# Patient Record
Sex: FEMALE
[De-identification: names, ages, dates, MRNs, and addresses within clinical notes are randomized per-mention and may not be internally consistent; named-entity substitution may affect disease eponyms.]

---

## 2019-01-01 ENCOUNTER — HOSPITAL ENCOUNTER (INPATIENT)
Dept: HOSPITAL 90 - EDH | Age: 69
LOS: 4 days | Discharge: HOME | End: 2019-01-05
Payer: MEDICARE

## 2019-01-01 DIAGNOSIS — E86.9: ICD-10-CM

## 2019-01-01 DIAGNOSIS — J18.9: ICD-10-CM

## 2019-01-01 DIAGNOSIS — R11.2: ICD-10-CM

## 2019-01-01 DIAGNOSIS — J45.909: ICD-10-CM

## 2019-01-01 DIAGNOSIS — K57.92: Primary | ICD-10-CM

## 2019-01-01 DIAGNOSIS — I10: ICD-10-CM

## 2019-01-01 DIAGNOSIS — I73.9: ICD-10-CM

## 2020-11-28 ENCOUNTER — HOSPITAL ENCOUNTER (INPATIENT)
Dept: HOSPITAL 90 - EDH | Age: 70
LOS: 3 days | Discharge: HOME HEALTH | DRG: 623 | End: 2020-12-01
Attending: INTERNAL MEDICINE | Admitting: INTERNAL MEDICINE
Payer: MEDICARE

## 2020-11-28 VITALS — BODY MASS INDEX: 43.24 KG/M2 | WEIGHT: 235 LBS | HEIGHT: 62 IN

## 2020-11-28 DIAGNOSIS — Z83.3: ICD-10-CM

## 2020-11-28 DIAGNOSIS — N18.9: ICD-10-CM

## 2020-11-28 DIAGNOSIS — R32: ICD-10-CM

## 2020-11-28 DIAGNOSIS — L02.611: ICD-10-CM

## 2020-11-28 DIAGNOSIS — E66.01: ICD-10-CM

## 2020-11-28 DIAGNOSIS — E78.5: ICD-10-CM

## 2020-11-28 DIAGNOSIS — E11.42: ICD-10-CM

## 2020-11-28 DIAGNOSIS — M54.9: ICD-10-CM

## 2020-11-28 DIAGNOSIS — D64.9: ICD-10-CM

## 2020-11-28 DIAGNOSIS — E11.51: ICD-10-CM

## 2020-11-28 DIAGNOSIS — Z20.828: ICD-10-CM

## 2020-11-28 DIAGNOSIS — L03.031: ICD-10-CM

## 2020-11-28 DIAGNOSIS — I12.9: ICD-10-CM

## 2020-11-28 DIAGNOSIS — E11.621: Primary | ICD-10-CM

## 2020-11-28 DIAGNOSIS — L97.519: ICD-10-CM

## 2020-11-28 DIAGNOSIS — N17.9: ICD-10-CM

## 2020-11-28 DIAGNOSIS — Z98.1: ICD-10-CM

## 2020-11-28 DIAGNOSIS — Z96.652: ICD-10-CM

## 2020-11-28 DIAGNOSIS — Z98.84: ICD-10-CM

## 2020-11-28 DIAGNOSIS — G89.29: ICD-10-CM

## 2020-11-28 DIAGNOSIS — Z90.710: ICD-10-CM

## 2020-11-28 LAB
ALBUMIN SERPL-MCNC: 2.8 G/DL (ref 3.5–5)
ALT SERPL-CCNC: 44 U/L (ref 12–78)
APTT PPP: 30.8 SEC (ref 26.3–35.5)
AST SERPL-CCNC: 56 U/L (ref 10–37)
BASOPHILS NFR BLD AUTO: 0.4 % (ref 0–5)
BILIRUB SERPL-MCNC: 0.5 MG/DL (ref 0.2–1)
BUN SERPL-MCNC: 24 MG/DL (ref 7–18)
CHLORIDE SERPL-SCNC: 101 MMOL/L (ref 101–111)
CO2 SERPL-SCNC: 25 MMOL/L (ref 21–32)
CREAT SERPL-MCNC: 2.9 MG/DL (ref 0.5–1.5)
EOSINOPHIL NFR BLD AUTO: 1.6 % (ref 0–8)
ERYTHROCYTE [DISTWIDTH] IN BLOOD BY AUTOMATED COUNT: 14.7 % (ref 11–15.5)
GFR SERPL CREATININE-BSD FRML MDRD: 17 ML/MIN (ref 60–?)
GLUCOSE SERPL-MCNC: 100 MG/DL (ref 70–105)
HCT VFR BLD AUTO: 30.2 % (ref 36–48)
INR PPP: 0.95 (ref 0.85–1.15)
LYMPHOCYTES NFR SPEC AUTO: 12.3 % (ref 21–51)
MCH RBC QN AUTO: 27.9 PG (ref 27–33)
MCHC RBC AUTO-ENTMCNC: 31.5 G/DL (ref 32–36)
MCV RBC AUTO: 88.6 FL (ref 79–99)
MONOCYTES NFR BLD AUTO: 10.5 % (ref 3–13)
NEUTROPHILS NFR BLD AUTO: 74.2 % (ref 40–77)
NRBC BLD MANUAL-RTO: 0 % (ref 0–0.19)
PLATELET # BLD AUTO: 254 K/UL (ref 130–400)
POTASSIUM SERPL-SCNC: 3.7 MMOL/L (ref 3.5–5.1)
PROT SERPL-MCNC: 7 G/DL (ref 6–8.3)
PROTHROMBIN TIME: 10.3 SEC (ref 9.6–11.6)
RBC # BLD AUTO: 3.41 MIL/UL (ref 4–5.5)
SODIUM SERPL-SCNC: 137 MMOL/L (ref 136–145)
WBC # BLD AUTO: 13.7 K/UL (ref 4.8–10.8)

## 2020-11-28 PROCEDURE — 87076 CULTURE ANAEROBE IDENT EACH: CPT

## 2020-11-28 PROCEDURE — 83550 IRON BINDING TEST: CPT

## 2020-11-28 PROCEDURE — 73630 X-RAY EXAM OF FOOT: CPT

## 2020-11-28 PROCEDURE — 87426 SARSCOV CORONAVIRUS AG IA: CPT

## 2020-11-28 PROCEDURE — 85610 PROTHROMBIN TIME: CPT

## 2020-11-28 PROCEDURE — 87070 CULTURE OTHR SPECIMN AEROBIC: CPT

## 2020-11-28 PROCEDURE — 82948 REAGENT STRIP/BLOOD GLUCOSE: CPT

## 2020-11-28 PROCEDURE — 73718 MRI LOWER EXTREMITY W/O DYE: CPT

## 2020-11-28 PROCEDURE — 93005 ELECTROCARDIOGRAM TRACING: CPT

## 2020-11-28 PROCEDURE — 84100 ASSAY OF PHOSPHORUS: CPT

## 2020-11-28 PROCEDURE — 84550 ASSAY OF BLOOD/URIC ACID: CPT

## 2020-11-28 PROCEDURE — 83036 HEMOGLOBIN GLYCOSYLATED A1C: CPT

## 2020-11-28 PROCEDURE — 84484 ASSAY OF TROPONIN QUANT: CPT

## 2020-11-28 PROCEDURE — 82728 ASSAY OF FERRITIN: CPT

## 2020-11-28 PROCEDURE — 85027 COMPLETE CBC AUTOMATED: CPT

## 2020-11-28 PROCEDURE — 83540 ASSAY OF IRON: CPT

## 2020-11-28 PROCEDURE — 85730 THROMBOPLASTIN TIME PARTIAL: CPT

## 2020-11-28 PROCEDURE — 83735 ASSAY OF MAGNESIUM: CPT

## 2020-11-28 PROCEDURE — 36415 COLL VENOUS BLD VENIPUNCTURE: CPT

## 2020-11-28 PROCEDURE — 80048 BASIC METABOLIC PNL TOTAL CA: CPT

## 2020-11-28 PROCEDURE — 80053 COMPREHEN METABOLIC PANEL: CPT

## 2020-11-28 PROCEDURE — 87040 BLOOD CULTURE FOR BACTERIA: CPT

## 2020-11-28 PROCEDURE — 93925 LOWER EXTREMITY STUDY: CPT

## 2020-11-28 PROCEDURE — 85025 COMPLETE CBC W/AUTO DIFF WBC: CPT

## 2020-11-28 PROCEDURE — 71045 X-RAY EXAM CHEST 1 VIEW: CPT

## 2020-11-28 RX ADMIN — SODIUM CHLORIDE SCH UNIT: 9 INJECTION, SOLUTION INTRAVENOUS at 21:00

## 2020-11-29 VITALS — SYSTOLIC BLOOD PRESSURE: 114 MMHG | DIASTOLIC BLOOD PRESSURE: 46 MMHG

## 2020-11-29 VITALS — SYSTOLIC BLOOD PRESSURE: 114 MMHG | DIASTOLIC BLOOD PRESSURE: 58 MMHG

## 2020-11-29 VITALS — SYSTOLIC BLOOD PRESSURE: 97 MMHG | DIASTOLIC BLOOD PRESSURE: 54 MMHG

## 2020-11-29 VITALS — SYSTOLIC BLOOD PRESSURE: 121 MMHG | DIASTOLIC BLOOD PRESSURE: 55 MMHG

## 2020-11-29 VITALS — SYSTOLIC BLOOD PRESSURE: 98 MMHG | DIASTOLIC BLOOD PRESSURE: 48 MMHG

## 2020-11-29 LAB
ALBUMIN SERPL-MCNC: 2.3 G/DL (ref 3.5–5)
ALT SERPL-CCNC: 39 U/L (ref 12–78)
AST SERPL-CCNC: 54 U/L (ref 10–37)
BILIRUB SERPL-MCNC: 0.4 MG/DL (ref 0.2–1)
BUN SERPL-MCNC: 28 MG/DL (ref 7–18)
CHLORIDE SERPL-SCNC: 103 MMOL/L (ref 101–111)
CO2 SERPL-SCNC: 24 MMOL/L (ref 21–32)
CREAT SERPL-MCNC: 2.7 MG/DL (ref 0.5–1.5)
ERYTHROCYTE [DISTWIDTH] IN BLOOD BY AUTOMATED COUNT: 14.8 % (ref 11–15.5)
GFR SERPL CREATININE-BSD FRML MDRD: 18 ML/MIN (ref 60–?)
GLUCOSE SERPL-MCNC: 91 MG/DL (ref 70–105)
HCT VFR BLD AUTO: 28.6 % (ref 36–48)
MAGNESIUM SERPL-MCNC: 1.8 MG/DL (ref 1.8–2.4)
MCH RBC QN AUTO: 27.9 PG (ref 27–33)
MCHC RBC AUTO-ENTMCNC: 31.1 G/DL (ref 32–36)
MCV RBC AUTO: 89.7 FL (ref 79–99)
NRBC BLD MANUAL-RTO: 0 % (ref 0–0.19)
PHOSPHATE SERPL-MCNC: 5.4 MG/DL (ref 2.5–4.9)
PLATELET # BLD AUTO: 231 K/UL (ref 130–400)
POTASSIUM SERPL-SCNC: 3.7 MMOL/L (ref 3.5–5.1)
PROT SERPL-MCNC: 6.3 G/DL (ref 6–8.3)
RBC # BLD AUTO: 3.19 MIL/UL (ref 4–5.5)
SODIUM SERPL-SCNC: 138 MMOL/L (ref 136–145)
URATE SERPL-MCNC: 7.5 MG/DL (ref 2.6–7.2)
WBC # BLD AUTO: 9.9 K/UL (ref 4.8–10.8)

## 2020-11-29 PROCEDURE — 0JBQ0ZZ EXCISION OF RIGHT FOOT SUBCUTANEOUS TISSUE AND FASCIA, OPEN APPROACH: ICD-10-PCS | Performed by: PODIATRIST

## 2020-11-29 RX ADMIN — PIPERACILLIN SODIUM AND TAZOBACTAM SODIUM SCH MLS/HR: .375; 3 INJECTION, POWDER, LYOPHILIZED, FOR SOLUTION INTRAVENOUS at 21:37

## 2020-11-29 RX ADMIN — PANTOPRAZOLE SODIUM SCH MG: 40 TABLET, DELAYED RELEASE ORAL at 08:08

## 2020-11-29 RX ADMIN — ASCORBIC ACID, FOLIC ACID, NIACIN, THIAMINE, RIBOFLAVIN, PYRIDOXINE, CYANOCOBALAMIN, PANTOTHENIC ACID, BIOTIN SCH CAP: 100; 150; 6; 1; 20; 5; 10; 1.7; 1.5 CAPSULE, LIQUID FILLED ORAL at 08:10

## 2020-11-29 RX ADMIN — CLINDAMYCIN PHOSPHATE SCH MLS/HR: 6 INJECTION, SOLUTION INTRAVENOUS at 08:14

## 2020-11-29 RX ADMIN — CLINDAMYCIN PHOSPHATE SCH MLS/HR: 6 INJECTION, SOLUTION INTRAVENOUS at 21:44

## 2020-11-29 RX ADMIN — HYDROMORPHONE HYDROCHLORIDE PRN MG: 1 INJECTION, SOLUTION INTRAMUSCULAR; INTRAVENOUS; SUBCUTANEOUS at 22:44

## 2020-11-29 RX ADMIN — SODIUM CHLORIDE SCH UNIT: 9 INJECTION, SOLUTION INTRAVENOUS at 16:30

## 2020-11-29 RX ADMIN — SODIUM CHLORIDE SCH UNIT: 9 INJECTION, SOLUTION INTRAVENOUS at 21:00

## 2020-11-29 RX ADMIN — CLINDAMYCIN PHOSPHATE SCH MLS/HR: 6 INJECTION, SOLUTION INTRAVENOUS at 15:31

## 2020-11-29 RX ADMIN — PIPERACILLIN SODIUM AND TAZOBACTAM SODIUM SCH MLS/HR: .375; 3 INJECTION, POWDER, LYOPHILIZED, FOR SOLUTION INTRAVENOUS at 08:10

## 2020-11-29 RX ADMIN — SODIUM CHLORIDE SCH UNIT: 9 INJECTION, SOLUTION INTRAVENOUS at 11:30

## 2020-11-29 RX ADMIN — ACETAMINOPHEN PRN MG: 325 TABLET, FILM COATED ORAL at 08:09

## 2020-11-29 RX ADMIN — SODIUM CHLORIDE SCH UNIT: 9 INJECTION, SOLUTION INTRAVENOUS at 07:30

## 2020-11-29 RX ADMIN — HYDROMORPHONE HYDROCHLORIDE PRN MG: 1 INJECTION, SOLUTION INTRAMUSCULAR; INTRAVENOUS; SUBCUTANEOUS at 16:44

## 2020-11-29 NOTE — NUR
cm note

met with patient and states resides at home with spouse, uses cane for ambulation, does own 
adls. no home services. pt drives. dc plan is back home at time of dc. 

-------------------------------------------------------------------------------

Addendum: 11/29/20 at 1725 by JOSÉ LUIS RUTHERFORD CM

-------------------------------------------------------------------------------

Amended: Links added.

## 2020-11-29 NOTE — NUR
ADMISSION NOTE:



Admitted to floor via stretcher from ER. Fully awake and responsive. Denies pain or any 
discomfort. VS checked and recorded. Physical assessment done. ( See CPOE flow chart for 
full assessment). Has IV site to RAC #20g, SL - patent and intact. Photo of the wound to 
right foot taken and attached to chart. To consult Dr. Marshall in AM per ER staff report. 
Home meds listed. Oriented to room and used of call light. Policies and procedures 
explained. Agreed and verbalized understanding. Plan of care initiated. No apparent distress 
noted. Needs attended and cared for.

## 2020-11-30 VITALS — SYSTOLIC BLOOD PRESSURE: 123 MMHG | DIASTOLIC BLOOD PRESSURE: 63 MMHG

## 2020-11-30 VITALS — DIASTOLIC BLOOD PRESSURE: 77 MMHG | SYSTOLIC BLOOD PRESSURE: 137 MMHG

## 2020-11-30 VITALS — SYSTOLIC BLOOD PRESSURE: 151 MMHG | DIASTOLIC BLOOD PRESSURE: 79 MMHG

## 2020-11-30 VITALS — SYSTOLIC BLOOD PRESSURE: 136 MMHG | DIASTOLIC BLOOD PRESSURE: 53 MMHG

## 2020-11-30 VITALS — SYSTOLIC BLOOD PRESSURE: 143 MMHG | DIASTOLIC BLOOD PRESSURE: 50 MMHG

## 2020-11-30 VITALS — DIASTOLIC BLOOD PRESSURE: 76 MMHG | SYSTOLIC BLOOD PRESSURE: 159 MMHG

## 2020-11-30 LAB
BASOPHILS NFR BLD AUTO: 0.8 % (ref 0–5)
BUN SERPL-MCNC: 22 MG/DL (ref 7–18)
CHLORIDE SERPL-SCNC: 107 MMOL/L (ref 101–111)
CO2 SERPL-SCNC: 26 MMOL/L (ref 21–32)
CREAT SERPL-MCNC: 1.4 MG/DL (ref 0.5–1.5)
EOSINOPHIL NFR BLD AUTO: 4.9 % (ref 0–8)
ERYTHROCYTE [DISTWIDTH] IN BLOOD BY AUTOMATED COUNT: 14.7 % (ref 11–15.5)
FERRITIN SERPL-MCNC: 183 NG/ML (ref 15–150)
GFR SERPL CREATININE-BSD FRML MDRD: 39 ML/MIN (ref 60–?)
GLUCOSE SERPL-MCNC: 106 MG/DL (ref 70–105)
HBA1C MFR BLD: 6.7 % (ref 4–6)
HCT VFR BLD AUTO: 29.4 % (ref 36–48)
IRON SATN MFR SERPL: 11.9 % (ref 22–44)
IRON SERPL-MCNC: 20 MCG/DL (ref 50–170)
LYMPHOCYTES NFR SPEC AUTO: 19.6 % (ref 21–51)
MCH RBC QN AUTO: 28.2 PG (ref 27–33)
MCHC RBC AUTO-ENTMCNC: 32.7 G/DL (ref 32–36)
MCV RBC AUTO: 86.2 FL (ref 79–99)
MONOCYTES NFR BLD AUTO: 9.8 % (ref 3–13)
NEUTROPHILS NFR BLD AUTO: 63.6 % (ref 40–77)
NRBC BLD MANUAL-RTO: 0 % (ref 0–0.19)
PLATELET # BLD AUTO: 287 K/UL (ref 130–400)
POTASSIUM SERPL-SCNC: 3.6 MMOL/L (ref 3.5–5.1)
RBC # BLD AUTO: 3.41 MIL/UL (ref 4–5.5)
SODIUM SERPL-SCNC: 144 MMOL/L (ref 136–145)
TIBC SERPL-MCNC: 167 MCG/DL (ref 250–450)
WBC # BLD AUTO: 7.9 K/UL (ref 4.8–10.8)

## 2020-11-30 PROCEDURE — 0SBP0ZZ EXCISION OF RIGHT TOE PHALANGEAL JOINT, OPEN APPROACH: ICD-10-PCS | Performed by: PODIATRIST

## 2020-11-30 RX ADMIN — ACETAMINOPHEN PRN MG: 325 TABLET, FILM COATED ORAL at 00:21

## 2020-11-30 RX ADMIN — CLINDAMYCIN PHOSPHATE SCH MLS/HR: 6 INJECTION, SOLUTION INTRAVENOUS at 00:15

## 2020-11-30 RX ADMIN — SODIUM CHLORIDE SCH UNIT: 9 INJECTION, SOLUTION INTRAVENOUS at 07:30

## 2020-11-30 RX ADMIN — HYDROMORPHONE HYDROCHLORIDE PRN MG: 1 INJECTION, SOLUTION INTRAMUSCULAR; INTRAVENOUS; SUBCUTANEOUS at 11:02

## 2020-11-30 RX ADMIN — LINEZOLID SCH MG: 600 TABLET, FILM COATED ORAL at 12:48

## 2020-11-30 RX ADMIN — SODIUM CHLORIDE SCH UNIT: 9 INJECTION, SOLUTION INTRAVENOUS at 20:55

## 2020-11-30 RX ADMIN — PANTOPRAZOLE SODIUM SCH MG: 40 TABLET, DELAYED RELEASE ORAL at 09:50

## 2020-11-30 RX ADMIN — PIPERACILLIN SODIUM AND TAZOBACTAM SODIUM SCH MLS/HR: .375; 3 INJECTION, POWDER, LYOPHILIZED, FOR SOLUTION INTRAVENOUS at 21:34

## 2020-11-30 RX ADMIN — SODIUM CHLORIDE SCH UNIT: 9 INJECTION, SOLUTION INTRAVENOUS at 16:30

## 2020-11-30 RX ADMIN — ASCORBIC ACID, FOLIC ACID, NIACIN, THIAMINE, RIBOFLAVIN, PYRIDOXINE, CYANOCOBALAMIN, PANTOTHENIC ACID, BIOTIN SCH CAP: 100; 150; 6; 1; 20; 5; 10; 1.7; 1.5 CAPSULE, LIQUID FILLED ORAL at 09:49

## 2020-11-30 RX ADMIN — SODIUM CHLORIDE SCH UNIT: 9 INJECTION, SOLUTION INTRAVENOUS at 11:30

## 2020-11-30 RX ADMIN — CLINDAMYCIN PHOSPHATE SCH MLS/HR: 6 INJECTION, SOLUTION INTRAVENOUS at 09:50

## 2020-11-30 RX ADMIN — PIPERACILLIN SODIUM AND TAZOBACTAM SODIUM SCH MLS/HR: .375; 3 INJECTION, POWDER, LYOPHILIZED, FOR SOLUTION INTRAVENOUS at 09:50

## 2020-12-01 VITALS — SYSTOLIC BLOOD PRESSURE: 170 MMHG | DIASTOLIC BLOOD PRESSURE: 51 MMHG

## 2020-12-01 VITALS — SYSTOLIC BLOOD PRESSURE: 159 MMHG | DIASTOLIC BLOOD PRESSURE: 67 MMHG

## 2020-12-01 VITALS — SYSTOLIC BLOOD PRESSURE: 160 MMHG | DIASTOLIC BLOOD PRESSURE: 62 MMHG

## 2020-12-01 VITALS — DIASTOLIC BLOOD PRESSURE: 61 MMHG | SYSTOLIC BLOOD PRESSURE: 155 MMHG

## 2020-12-01 LAB
BUN SERPL-MCNC: 13 MG/DL (ref 7–18)
CHLORIDE SERPL-SCNC: 105 MMOL/L (ref 101–111)
CO2 SERPL-SCNC: 28 MMOL/L (ref 21–32)
CREAT SERPL-MCNC: 1 MG/DL (ref 0.5–1.5)
EOSINOPHIL NFR BLD MANUAL: 3 % (ref 1–6)
ERYTHROCYTE [DISTWIDTH] IN BLOOD BY AUTOMATED COUNT: 14.6 % (ref 11–15.5)
GFR SERPL CREATININE-BSD FRML MDRD: 58 ML/MIN (ref 60–?)
GLUCOSE SERPL-MCNC: 110 MG/DL (ref 70–105)
HCT VFR BLD AUTO: 31.9 % (ref 36–48)
LYMPHOCYTES NFR BLD MANUAL: 22 % (ref 22–44)
MANUAL DIF COMMENT BLD-IMP: (no result)
MCH RBC QN AUTO: 27.8 PG (ref 27–33)
MCHC RBC AUTO-ENTMCNC: 32.6 G/DL (ref 32–36)
MCV RBC AUTO: 85.3 FL (ref 79–99)
MONOCYTES NFR BLD MANUAL: 8 % (ref 2–9)
NEUTS SEG NFR BLD MANUAL: 67 % (ref 40–70)
NRBC BLD MANUAL-RTO: 0 % (ref 0–0.19)
PLAT MORPH BLD: ADEQUATE
PLATELET # BLD AUTO: 315 K/UL (ref 130–400)
POTASSIUM SERPL-SCNC: 3.3 MMOL/L (ref 3.5–5.1)
RBC # BLD AUTO: 3.74 MIL/UL (ref 4–5.5)
RBC MORPH BLD: (no result)
SODIUM SERPL-SCNC: 143 MMOL/L (ref 136–145)
WBC # BLD AUTO: 6.6 K/UL (ref 4.8–10.8)

## 2020-12-01 RX ADMIN — LINEZOLID SCH MG: 600 TABLET, FILM COATED ORAL at 12:53

## 2020-12-01 RX ADMIN — PANTOPRAZOLE SODIUM SCH MG: 40 TABLET, DELAYED RELEASE ORAL at 10:01

## 2020-12-01 RX ADMIN — LINEZOLID SCH MG: 600 TABLET, FILM COATED ORAL at 01:32

## 2020-12-01 RX ADMIN — PIPERACILLIN SODIUM AND TAZOBACTAM SODIUM SCH MLS/HR: .375; 3 INJECTION, POWDER, LYOPHILIZED, FOR SOLUTION INTRAVENOUS at 05:49

## 2020-12-01 RX ADMIN — ASCORBIC ACID, FOLIC ACID, NIACIN, THIAMINE, RIBOFLAVIN, PYRIDOXINE, CYANOCOBALAMIN, PANTOTHENIC ACID, BIOTIN SCH CAP: 100; 150; 6; 1; 20; 5; 10; 1.7; 1.5 CAPSULE, LIQUID FILLED ORAL at 10:01

## 2020-12-01 RX ADMIN — SODIUM CHLORIDE SCH UNIT: 9 INJECTION, SOLUTION INTRAVENOUS at 11:30

## 2020-12-01 RX ADMIN — SODIUM CHLORIDE SCH UNIT: 9 INJECTION, SOLUTION INTRAVENOUS at 05:49

## 2020-12-01 NOTE — NUR
pt asking for home health for dressing changes to right foot ulcer;  i have called farhat doty 
and asked her about this.  she stated i would need to check with dr chua or dr bahena to 
see if necessary

## 2020-12-01 NOTE — NUR
i have called and left a message on dr barrett phone in regards to d/c today and dressing 
changes and if home health needed;  pending call back

## 2020-12-01 NOTE — NUR
PT STATED UNDERSTANDING OF ALL D/C INSTRUCTIONS INCLUDING ANTIBIOTIC PERSCRIPTIONS THAT HAVE 
BEEN CALLED TO [facility], FOLLOW UP APPOINTMENTS--TO SEE DR MCMULLEN TOMORROW AM TO HAVE 
HOME HEALTH SET UP AND REFERRAL FOR DR COE;  WOUND CARE PER HOME HEALTH, SIGNS AND 
SYMPTOMS OF INFECTION TO WATCH FOR AND REPORT TO MD AND TO GO TO ED OR CALL 911 IF LIFE 
THREATENING PROBLEMS OCCUR.  IV AND TELE BOX REMOVED.

## 2020-12-01 NOTE — NUR
i received a call back from dr chua and he recommends that pt have home health for daily 
drsg changes;  i called dr bahena for order and he stated to have pt f/u tomorrow at his 
office or at dr del rio's to have it set up there.  i called soren dougherty and informed her of this

## 2020-12-01 NOTE — NUR
i called dr [facility] rio[facility] [place] and made arrangements for pt to be set up with home health 
through that office tomorrow.

## 2020-12-01 NOTE — NUR
CM NOTE/DC HOME AND F/U WITH HH REFERRAL

CALL RECEIVED FROM DAVID JUDGE, PRIMARY NURSE. STATES PATIENT WANTING HOME HEALTH FOR WOUND 
CARE AND THAT DR. KINNEY WAS INFORMED, STATES DR. KINNEY WISHES FOR PATIENT TO DC HOME AND 
TO FOLLOW UP WITH HIM IN HIS OFFICE FOR REFERRAL TO HOME HEALTH. PATIENT ANTICIPATES 
DISCHARGE HOME.

## 2020-12-02 NOTE — NUR
Transitional Care - Post Discharge Note



NO ANSWER. Patient was called at both numbers listed on file. Message was left requesting 
callback. 

-------------------------------------------------------------------------------

Addendum: 12/02/20 at 1426 by ISABELLA BOND

-------------------------------------------------------------------------------

Amended: Links added.

-------------------------------------------------------------------------------

Addendum: 12/02/20 at 1602 by ISABELLA BOND

-------------------------------------------------------------------------------

Patient called back. States she was at her PCP getting a dressing change. She is taking 
discharge medications as ordered. No complaints of pain, fevers, N/V/D reported. Patient 
will continue to visit PCP for dressing changes.

## 2024-09-20 ENCOUNTER — HOSPITAL ENCOUNTER (OUTPATIENT)
Dept: HOSPITAL 90 - RAH | Age: 74
Discharge: HOME | End: 2024-09-20
Attending: FAMILY MEDICINE
Payer: COMMERCIAL

## 2024-09-20 DIAGNOSIS — M25.712: ICD-10-CM

## 2024-09-20 DIAGNOSIS — M19.012: Primary | ICD-10-CM

## 2024-09-20 DIAGNOSIS — M81.0: ICD-10-CM

## 2024-09-20 DIAGNOSIS — M25.512: ICD-10-CM

## 2024-09-20 PROCEDURE — 73030 X-RAY EXAM OF SHOULDER: CPT
